# Patient Record
Sex: FEMALE | Race: ASIAN | NOT HISPANIC OR LATINO | Employment: OTHER | URBAN - METROPOLITAN AREA
[De-identification: names, ages, dates, MRNs, and addresses within clinical notes are randomized per-mention and may not be internally consistent; named-entity substitution may affect disease eponyms.]

---

## 2022-06-04 ENCOUNTER — TELEPHONE ENCOUNTER (OUTPATIENT)
Dept: URBAN - METROPOLITAN AREA CLINIC 68 | Facility: CLINIC | Age: 87
End: 2022-06-04

## 2022-06-04 RX ORDER — MIRABEGRON 25 MG/1
MYRBETRIQ( 25MG ORAL 1 DAILY ) INACTIVE -HX ENTRY TABLET, FILM COATED, EXTENDED RELEASE ORAL DAILY
OUTPATIENT
Start: 2015-05-07

## 2022-06-04 RX ORDER — POLYETHYLENE GLYCOL 3350, SODIUM SULFATE, SODIUM CHLORIDE, POTASSIUM CHLORIDE, ASCORBIC ACID, SODIUM ASCORBATE 7.5-2.691G
KIT ORAL AS DIRECTED
Qty: 1 | Refills: 0 | OUTPATIENT
Start: 2015-05-07 | End: 2015-05-08

## 2022-06-04 RX ORDER — POLYETHYLENE GLYOCOL 3350, SODIUM CHLORIDE, SODIUM BICARBONATE AND POTASSIUM CHLORIDE 420; 11.2; 5.72; 1.48 G/4L; G/4L; G/4L; G/4L
POWDER, FOR SOLUTION NASOGASTRIC; ORAL AS DIRECTED
Qty: 1 | Refills: 0 | OUTPATIENT
Start: 2014-10-27 | End: 2014-10-28

## 2022-06-04 RX ORDER — LEVOTHYROXINE SODIUM 0.09 MG/1
LEVOTHYROXINE SODIUM( 88MCG ORAL 1 DAILY ) INACTIVE -HX ENTRY TABLET ORAL DAILY
OUTPATIENT
Start: 2017-02-28

## 2022-06-05 ENCOUNTER — TELEPHONE ENCOUNTER (OUTPATIENT)
Dept: URBAN - METROPOLITAN AREA CLINIC 68 | Facility: CLINIC | Age: 87
End: 2022-06-05

## 2022-06-05 RX ORDER — ZINC OXIDE 13 %
PROBIOTIC DAILY(  ORAL 1 TWO TIMES DAILY ) ACTIVE -HX ENTRY CREAM (GRAM) TOPICAL
Status: ACTIVE | COMMUNITY
Start: 2017-02-28

## 2022-06-05 RX ORDER — SERTRALINE HYDROCHLORIDE 50 MG/1
ZOLOFT( 50MG ORAL 1 TWO TIMES DAILY ) ACTIVE -HX ENTRY TABLET, FILM COATED ORAL
Status: ACTIVE | COMMUNITY
Start: 2017-02-28

## 2022-06-05 RX ORDER — FLAXSEED OIL 1000 MG
FLAX SEED OIL( 1000MG ORAL  DAILY ) ACTIVE -HX ENTRY CAPSULE ORAL DAILY
Status: ACTIVE | COMMUNITY
Start: 2017-02-28

## 2022-06-05 RX ORDER — TITANIUM DIOXIDE, OCTINOXATE, ZINC OXIDE 4.61; 1.6; .78 G/40ML; G/40ML; G/40ML
CRANBERRY( 400MG ORAL 4 TWO TIMES DAILY ) ACTIVE -HX ENTRY CREAM TOPICAL
Status: ACTIVE | COMMUNITY
Start: 2017-02-28

## 2022-06-05 RX ORDER — CHOLECALCIFEROL (VITAMIN D3) 50 MCG
D3 DOTS( 2000UNIT ORAL 1 DAILY ) ACTIVE -HX ENTRY TABLET ORAL DAILY
Status: ACTIVE | COMMUNITY
Start: 2017-02-28

## 2022-06-05 RX ORDER — LEVOTHYROXINE SODIUM 75 UG/1
LEVOTHYROXINE SODIUM( 75MCG ORAL  DAILY ) ACTIVE -HX ENTRY CAPSULE ORAL DAILY
Status: ACTIVE | COMMUNITY
Start: 2017-02-28

## 2022-06-25 ENCOUNTER — TELEPHONE ENCOUNTER (OUTPATIENT)
Age: 87
End: 2022-06-25

## 2022-06-25 RX ORDER — SODIUM SULFATE, POTASSIUM SULFATE, MAGNESIUM SULFATE 17.5; 3.13; 1.6 G/ML; G/ML; G/ML
SOLUTION, CONCENTRATE ORAL AS DIRECTED
Qty: 1 | Refills: 0 | OUTPATIENT
Start: 2017-03-02 | End: 2017-03-03

## 2022-06-25 RX ORDER — POLYETHYLENE GLYCOL 3350, SODIUM SULFATE, SODIUM CHLORIDE, POTASSIUM CHLORIDE, ASCORBIC ACID, SODIUM ASCORBATE 7.5-2.691G
KIT ORAL AS DIRECTED
Qty: 1 | Refills: 0 | OUTPATIENT
Start: 2015-05-07 | End: 2015-05-08

## 2022-06-25 RX ORDER — LEVOTHYROXINE SODIUM 0.09 MG/1
LEVOTHYROXINE SODIUM( 88MCG ORAL 1 DAILY ) INACTIVE -HX ENTRY TABLET ORAL DAILY
OUTPATIENT
Start: 2017-02-28

## 2022-06-25 RX ORDER — CALCIUM CARBONATE 1250 MG/5ML
CALCIUM CARBONATE( 650MG ORAL 1 DAILY ) INACTIVE -HX ENTRY SUSPENSION ORAL DAILY
OUTPATIENT
Start: 2014-10-27

## 2022-06-25 RX ORDER — MIRABEGRON 25 MG/1
MYRBETRIQ( 25MG ORAL 1 DAILY ) INACTIVE -HX ENTRY TABLET, FILM COATED, EXTENDED RELEASE ORAL DAILY
OUTPATIENT
Start: 2015-05-07

## 2022-06-26 ENCOUNTER — TELEPHONE ENCOUNTER (OUTPATIENT)
Age: 87
End: 2022-06-26

## 2022-06-26 RX ORDER — TITANIUM DIOXIDE, OCTINOXATE, ZINC OXIDE 4.61; 1.6; .78 G/40ML; G/40ML; G/40ML
CRANBERRY( 400MG ORAL 4 TWO TIMES DAILY ) ACTIVE -HX ENTRY CREAM TOPICAL
Status: ACTIVE | COMMUNITY
Start: 2017-02-28

## 2022-06-26 RX ORDER — SERTRALINE HCL 50 MG
ZOLOFT( 50MG ORAL 1 TWO TIMES DAILY ) ACTIVE -HX ENTRY TABLET ORAL
Status: ACTIVE | COMMUNITY
Start: 2017-02-28

## 2022-06-26 RX ORDER — LEVOTHYROXINE SODIUM 100 UG/1
LEVOTHYROXINE SODIUM( 75MCG ORAL  DAILY ) ACTIVE -HX ENTRY CAPSULE ORAL DAILY
Status: ACTIVE | COMMUNITY
Start: 2017-02-28

## 2022-06-26 RX ORDER — ZINC OXIDE 13 %
PROBIOTIC DAILY(  ORAL 1 TWO TIMES DAILY ) ACTIVE -HX ENTRY CREAM (GRAM) TOPICAL
Status: ACTIVE | COMMUNITY
Start: 2017-02-28

## 2022-06-26 RX ORDER — FLAXSEED OIL 1000 MG
FLAX SEED OIL( 1000MG ORAL  DAILY ) ACTIVE -HX ENTRY CAPSULE ORAL DAILY
Status: ACTIVE | COMMUNITY
Start: 2017-02-28

## 2022-06-26 RX ORDER — GABAPENTIN 400 MG/1
GABAPENTIN( 400MG ORAL 3 DAILY ) ACTIVE -HX ENTRY CAPSULE ORAL DAILY
Status: ACTIVE | COMMUNITY
Start: 2017-02-28

## 2023-06-07 ENCOUNTER — TELEPHONE ENCOUNTER (OUTPATIENT)
Dept: URBAN - METROPOLITAN AREA CLINIC 68 | Facility: CLINIC | Age: 88
End: 2023-06-07

## 2023-06-07 ENCOUNTER — OFFICE VISIT (OUTPATIENT)
Dept: URBAN - METROPOLITAN AREA CLINIC 68 | Facility: CLINIC | Age: 88
End: 2023-06-07
Payer: MEDICARE

## 2023-06-07 ENCOUNTER — WEB ENCOUNTER (OUTPATIENT)
Dept: URBAN - METROPOLITAN AREA CLINIC 68 | Facility: CLINIC | Age: 88
End: 2023-06-07

## 2023-06-07 ENCOUNTER — DASHBOARD ENCOUNTERS (OUTPATIENT)
Age: 88
End: 2023-06-07

## 2023-06-07 VITALS
WEIGHT: 113 LBS | SYSTOLIC BLOOD PRESSURE: 120 MMHG | DIASTOLIC BLOOD PRESSURE: 70 MMHG | HEIGHT: 64 IN | BODY MASS INDEX: 19.29 KG/M2

## 2023-06-07 DIAGNOSIS — C19 MALIGNANT NEOPLASM OF RECTOSIGMOID JUNCTION: ICD-10-CM

## 2023-06-07 DIAGNOSIS — D64.9 ANEMIA, UNSPECIFIED TYPE: ICD-10-CM

## 2023-06-07 DIAGNOSIS — K59.09 CHRONIC CONSTIPATION: ICD-10-CM

## 2023-06-07 PROCEDURE — 99203 OFFICE O/P NEW LOW 30 MIN: CPT | Performed by: SPECIALIST

## 2023-06-07 RX ORDER — LEVOTHYROXINE SODIUM 75 UG/1
LEVOTHYROXINE SODIUM( 75MCG ORAL  DAILY ) ACTIVE -HX ENTRY CAPSULE ORAL DAILY
Status: ACTIVE | COMMUNITY
Start: 2017-02-28

## 2023-06-07 RX ORDER — FLAXSEED OIL 1000 MG
FLAX SEED OIL( 1000MG ORAL  DAILY ) ACTIVE -HX ENTRY CAPSULE ORAL DAILY
Status: ON HOLD | COMMUNITY
Start: 2017-02-28

## 2023-06-07 RX ORDER — SERTRALINE HCL 50 MG
ZOLOFT( 50MG ORAL 1 TWO TIMES DAILY ) ACTIVE -HX ENTRY TABLET ORAL
Status: ACTIVE | COMMUNITY
Start: 2017-02-28

## 2023-06-07 RX ORDER — GABAPENTIN 400 MG/1
GABAPENTIN( 400MG ORAL 3 DAILY ) ACTIVE -HX ENTRY CAPSULE ORAL DAILY
Status: ACTIVE | COMMUNITY
Start: 2017-02-28

## 2023-06-07 RX ORDER — LEVOTHYROXINE SODIUM 100 UG/1
LEVOTHYROXINE SODIUM( 75MCG ORAL  DAILY ) ACTIVE -HX ENTRY CAPSULE ORAL DAILY
Status: ACTIVE | COMMUNITY
Start: 2017-02-28

## 2023-06-07 RX ORDER — ZINC OXIDE 13 %
PROBIOTIC DAILY(  ORAL 1 TWO TIMES DAILY ) ACTIVE -HX ENTRY CREAM (GRAM) TOPICAL
Status: ON HOLD | COMMUNITY
Start: 2017-02-28

## 2023-06-07 RX ORDER — LEVOTHYROXINE, LIOTHYRONINE 19; 4.5 UG/1; UG/1
TAKE ONE TABLET BY MOUTH ONE TIME DAILY ON AN EMPTY STOMACH TABLET ORAL
Qty: 90 UNSPECIFIED | Refills: 0 | Status: ACTIVE | COMMUNITY

## 2023-06-07 RX ORDER — DESVENLAFAXINE 100 MG/1
TAKE ONE TABLET BY MOUTH ONE TIME DAILY TABLET, EXTENDED RELEASE ORAL
Qty: 90 UNSPECIFIED | Refills: 0 | Status: ACTIVE | COMMUNITY

## 2023-06-07 RX ORDER — TITANIUM DIOXIDE, OCTINOXATE, ZINC OXIDE 4.61; 1.6; .78 G/40ML; G/40ML; G/40ML
CRANBERRY( 400MG ORAL 4 TWO TIMES DAILY ) ACTIVE -HX ENTRY CREAM TOPICAL
Status: ACTIVE | COMMUNITY
Start: 2017-02-28

## 2023-06-07 RX ORDER — METHENAMINE HIPPURATE 1 G/1
TAKE ONE TABLET BY MOUTH TWICE A DAY TABLET ORAL
Qty: 180 UNSPECIFIED | Refills: 0 | Status: ACTIVE | COMMUNITY

## 2023-06-07 RX ORDER — MIDODRINE HYDROCHLORIDE 2.5 MG/1
TAKE ONE TABLET BY MOUTH THREE TIMES A DAY ( WITH BREAKFAST, LUNCH AND EVENING MEAL ) TABLET ORAL
Qty: 90 UNSPECIFIED | Refills: 0 | Status: ON HOLD | COMMUNITY

## 2023-06-07 RX ORDER — PRAVASTATIN SODIUM 20 MG/1
TABLET ORAL
Qty: 90 TABLET | Status: ACTIVE | COMMUNITY

## 2023-06-07 RX ORDER — TRIAMCINOLONE ACETONIDE 1 MG/G
APPLY TO THE AFFECTED AREA(S) TOPICALLY TWICE DAILY FOR 5 DAYS CREAM TOPICAL
Qty: 15 UNSPECIFIED | Refills: 0 | Status: ON HOLD | COMMUNITY

## 2024-10-29 ENCOUNTER — TELEPHONE ENCOUNTER (OUTPATIENT)
Dept: URBAN - METROPOLITAN AREA CLINIC 6 | Facility: CLINIC | Age: 89
End: 2024-10-29

## 2024-11-05 ENCOUNTER — OFFICE VISIT (OUTPATIENT)
Dept: URBAN - METROPOLITAN AREA CLINIC 68 | Facility: CLINIC | Age: 89
End: 2024-11-05

## 2024-11-05 ENCOUNTER — LAB OUTSIDE AN ENCOUNTER (OUTPATIENT)
Dept: URBAN - METROPOLITAN AREA CLINIC 68 | Facility: CLINIC | Age: 89
End: 2024-11-05

## 2024-11-05 VITALS
DIASTOLIC BLOOD PRESSURE: 78 MMHG | SYSTOLIC BLOOD PRESSURE: 126 MMHG | TEMPERATURE: 98.6 F | HEART RATE: 67 BPM | WEIGHT: 110 LBS | HEIGHT: 64 IN | BODY MASS INDEX: 18.78 KG/M2 | OXYGEN SATURATION: 98 %

## 2024-11-05 RX ORDER — MIDODRINE HYDROCHLORIDE 2.5 MG/1
TAKE ONE TABLET BY MOUTH THREE TIMES A DAY ( WITH BREAKFAST, LUNCH AND EVENING MEAL ) TABLET ORAL
Qty: 90 UNSPECIFIED | Refills: 0 | Status: ACTIVE | COMMUNITY

## 2024-11-05 RX ORDER — LEVOTHYROXINE SODIUM 100 UG/1
LEVOTHYROXINE SODIUM( 75MCG ORAL  DAILY ) ACTIVE -HX ENTRY CAPSULE ORAL DAILY
Status: ACTIVE | COMMUNITY
Start: 2017-02-28

## 2024-11-05 RX ORDER — PRAVASTATIN SODIUM 20 MG/1
TABLET ORAL
Qty: 90 TABLET | Status: ACTIVE | COMMUNITY

## 2024-11-05 RX ORDER — TITANIUM DIOXIDE, OCTINOXATE, ZINC OXIDE 4.61; 1.6; .78 G/40ML; G/40ML; G/40ML
CRANBERRY( 400MG ORAL 4 TWO TIMES DAILY ) ACTIVE -HX ENTRY CREAM TOPICAL
Status: ACTIVE | COMMUNITY
Start: 2017-02-28

## 2024-11-05 RX ORDER — DESVENLAFAXINE 100 MG/1
TAKE ONE TABLET BY MOUTH ONE TIME DAILY TABLET, EXTENDED RELEASE ORAL
Qty: 90 UNSPECIFIED | Refills: 0 | Status: ACTIVE | COMMUNITY

## 2024-11-05 RX ORDER — SERTRALINE HCL 50 MG
ZOLOFT( 50MG ORAL 1 TWO TIMES DAILY ) ACTIVE -HX ENTRY TABLET ORAL
Status: ACTIVE | COMMUNITY
Start: 2017-02-28

## 2024-11-05 RX ORDER — LEVOTHYROXINE SODIUM 75 UG/1
LEVOTHYROXINE SODIUM( 75MCG ORAL  DAILY ) ACTIVE -HX ENTRY CAPSULE ORAL DAILY
Status: ACTIVE | COMMUNITY
Start: 2017-02-28

## 2024-11-05 RX ORDER — GABAPENTIN 400 MG/1
GABAPENTIN( 400MG ORAL 3 DAILY ) ACTIVE -HX ENTRY CAPSULE ORAL DAILY
Status: ACTIVE | COMMUNITY
Start: 2017-02-28

## 2024-11-05 RX ORDER — METHENAMINE HIPPURATE 1 G/1
TAKE ONE TABLET BY MOUTH TWICE A DAY TABLET ORAL
Qty: 180 UNSPECIFIED | Refills: 0 | Status: ACTIVE | COMMUNITY

## 2024-11-05 RX ORDER — FLAXSEED OIL 1000 MG
FLAX SEED OIL( 1000MG ORAL  DAILY ) ACTIVE -HX ENTRY CAPSULE ORAL DAILY
Status: ACTIVE | COMMUNITY
Start: 2017-02-28

## 2024-11-05 RX ORDER — LEVOTHYROXINE, LIOTHYRONINE 19; 4.5 UG/1; UG/1
TAKE ONE TABLET BY MOUTH ONE TIME DAILY ON AN EMPTY STOMACH TABLET ORAL
Qty: 90 UNSPECIFIED | Refills: 0 | Status: ACTIVE | COMMUNITY

## 2024-11-05 RX ORDER — TRIAMCINOLONE ACETONIDE 1 MG/G
APPLY TO THE AFFECTED AREA(S) TOPICALLY TWICE DAILY FOR 5 DAYS CREAM TOPICAL
Qty: 15 UNSPECIFIED | Refills: 0 | Status: ACTIVE | COMMUNITY

## 2024-11-05 RX ORDER — ZINC OXIDE 13 %
PROBIOTIC DAILY(  ORAL 1 TWO TIMES DAILY ) ACTIVE -HX ENTRY CREAM (GRAM) TOPICAL
Status: ACTIVE | COMMUNITY
Start: 2017-02-28

## 2024-11-05 NOTE — HPI-TODAY'S VISIT:
hasd stools once every 2-3 days , tried miralax too strong New diagnosis Parkinsons no bleeding nuisance swelling and slight leakage Last colon 7 years ago History colon surgery 2012

## 2024-11-07 ENCOUNTER — OFFICE VISIT (OUTPATIENT)
Dept: URBAN - METROPOLITAN AREA SURGERY CENTER 12 | Facility: SURGERY CENTER | Age: 89
End: 2024-11-07
Payer: MEDICARE

## 2024-11-07 DIAGNOSIS — R19.4 CHANGE IN BOWEL HABIT: ICD-10-CM

## 2024-11-07 DIAGNOSIS — K64.1 SECOND DEGREE HEMORRHOIDS: ICD-10-CM

## 2024-11-07 PROCEDURE — 45330 DIAGNOSTIC SIGMOIDOSCOPY: CPT | Performed by: SPECIALIST

## 2024-11-07 PROCEDURE — 45330 DIAGNOSTIC SIGMOIDOSCOPY: CPT | Performed by: CLINIC/CENTER

## 2024-11-07 RX ORDER — ZINC OXIDE 13 %
PROBIOTIC DAILY(  ORAL 1 TWO TIMES DAILY ) ACTIVE -HX ENTRY CREAM (GRAM) TOPICAL
Status: ACTIVE | COMMUNITY
Start: 2017-02-28

## 2024-11-07 RX ORDER — DESVENLAFAXINE 100 MG/1
TAKE ONE TABLET BY MOUTH ONE TIME DAILY TABLET, EXTENDED RELEASE ORAL
Qty: 90 UNSPECIFIED | Refills: 0 | Status: ACTIVE | COMMUNITY

## 2024-11-07 RX ORDER — FLAXSEED OIL 1000 MG
FLAX SEED OIL( 1000MG ORAL  DAILY ) ACTIVE -HX ENTRY CAPSULE ORAL DAILY
Status: ACTIVE | COMMUNITY
Start: 2017-02-28

## 2024-11-07 RX ORDER — GABAPENTIN 400 MG/1
GABAPENTIN( 400MG ORAL 3 DAILY ) ACTIVE -HX ENTRY CAPSULE ORAL DAILY
Status: ACTIVE | COMMUNITY
Start: 2017-02-28

## 2024-11-07 RX ORDER — SERTRALINE HCL 50 MG
ZOLOFT( 50MG ORAL 1 TWO TIMES DAILY ) ACTIVE -HX ENTRY TABLET ORAL
Status: ACTIVE | COMMUNITY
Start: 2017-02-28

## 2024-11-07 RX ORDER — METHENAMINE HIPPURATE 1 G/1
TAKE ONE TABLET BY MOUTH TWICE A DAY TABLET ORAL
Qty: 180 UNSPECIFIED | Refills: 0 | Status: ACTIVE | COMMUNITY

## 2024-11-07 RX ORDER — LEVOTHYROXINE, LIOTHYRONINE 19; 4.5 UG/1; UG/1
TAKE ONE TABLET BY MOUTH ONE TIME DAILY ON AN EMPTY STOMACH TABLET ORAL
Qty: 90 UNSPECIFIED | Refills: 0 | Status: ACTIVE | COMMUNITY

## 2024-11-07 RX ORDER — TRIAMCINOLONE ACETONIDE 1 MG/G
APPLY TO THE AFFECTED AREA(S) TOPICALLY TWICE DAILY FOR 5 DAYS CREAM TOPICAL
Qty: 15 UNSPECIFIED | Refills: 0 | Status: ACTIVE | COMMUNITY

## 2024-11-07 RX ORDER — LEVOTHYROXINE SODIUM 75 UG/1
LEVOTHYROXINE SODIUM( 75MCG ORAL  DAILY ) ACTIVE -HX ENTRY CAPSULE ORAL DAILY
Status: ACTIVE | COMMUNITY
Start: 2017-02-28

## 2024-11-07 RX ORDER — LEVOTHYROXINE SODIUM 100 UG/1
LEVOTHYROXINE SODIUM( 75MCG ORAL  DAILY ) ACTIVE -HX ENTRY CAPSULE ORAL DAILY
Status: ACTIVE | COMMUNITY
Start: 2017-02-28

## 2024-11-07 RX ORDER — MIDODRINE HYDROCHLORIDE 2.5 MG/1
TAKE ONE TABLET BY MOUTH THREE TIMES A DAY ( WITH BREAKFAST, LUNCH AND EVENING MEAL ) TABLET ORAL
Qty: 90 UNSPECIFIED | Refills: 0 | Status: ACTIVE | COMMUNITY

## 2024-11-07 RX ORDER — PRAVASTATIN SODIUM 20 MG/1
TABLET ORAL
Qty: 90 TABLET | Status: ACTIVE | COMMUNITY

## 2024-11-07 RX ORDER — TITANIUM DIOXIDE, OCTINOXATE, ZINC OXIDE 4.61; 1.6; .78 G/40ML; G/40ML; G/40ML
CRANBERRY( 400MG ORAL 4 TWO TIMES DAILY ) ACTIVE -HX ENTRY CREAM TOPICAL
Status: ACTIVE | COMMUNITY
Start: 2017-02-28

## 2024-11-18 ENCOUNTER — OFFICE VISIT (OUTPATIENT)
Dept: URBAN - METROPOLITAN AREA CLINIC 68 | Facility: CLINIC | Age: 89
End: 2024-11-18
Payer: MEDICARE

## 2024-11-18 ENCOUNTER — LAB OUTSIDE AN ENCOUNTER (OUTPATIENT)
Dept: URBAN - METROPOLITAN AREA CLINIC 68 | Facility: CLINIC | Age: 89
End: 2024-11-18

## 2024-11-18 VITALS
WEIGHT: 110 LBS | HEIGHT: 64 IN | DIASTOLIC BLOOD PRESSURE: 70 MMHG | SYSTOLIC BLOOD PRESSURE: 118 MMHG | BODY MASS INDEX: 18.78 KG/M2

## 2024-11-18 DIAGNOSIS — K64.1 GRADE II HEMORRHOIDS: ICD-10-CM

## 2024-11-18 PROCEDURE — 46221 LIGATION OF HEMORRHOID(S): CPT | Performed by: INTERNAL MEDICINE

## 2024-11-18 PROCEDURE — 99212 OFFICE O/P EST SF 10 MIN: CPT | Performed by: INTERNAL MEDICINE

## 2024-11-18 RX ORDER — SERTRALINE HCL 50 MG
ZOLOFT( 50MG ORAL 1 TWO TIMES DAILY ) ACTIVE -HX ENTRY TABLET ORAL
Status: ACTIVE | COMMUNITY
Start: 2017-02-28

## 2024-11-18 RX ORDER — LEVOTHYROXINE SODIUM 100 UG/1
LEVOTHYROXINE SODIUM( 75MCG ORAL  DAILY ) ACTIVE -HX ENTRY CAPSULE ORAL DAILY
Status: ACTIVE | COMMUNITY
Start: 2017-02-28

## 2024-11-18 RX ORDER — TITANIUM DIOXIDE, OCTINOXATE, ZINC OXIDE 4.61; 1.6; .78 G/40ML; G/40ML; G/40ML
CRANBERRY( 400MG ORAL 4 TWO TIMES DAILY ) ACTIVE -HX ENTRY CREAM TOPICAL
Status: ACTIVE | COMMUNITY
Start: 2017-02-28

## 2024-11-18 RX ORDER — DESVENLAFAXINE 100 MG/1
TAKE ONE TABLET BY MOUTH ONE TIME DAILY TABLET, EXTENDED RELEASE ORAL
Qty: 90 UNSPECIFIED | Refills: 0 | Status: ACTIVE | COMMUNITY

## 2024-11-18 RX ORDER — GABAPENTIN 400 MG/1
GABAPENTIN( 400MG ORAL 3 DAILY ) ACTIVE -HX ENTRY CAPSULE ORAL DAILY
Status: ACTIVE | COMMUNITY
Start: 2017-02-28

## 2024-11-18 RX ORDER — FLAXSEED OIL 1000 MG
FLAX SEED OIL( 1000MG ORAL  DAILY ) ACTIVE -HX ENTRY CAPSULE ORAL DAILY
Status: ACTIVE | COMMUNITY
Start: 2017-02-28

## 2024-11-18 RX ORDER — LEVOTHYROXINE SODIUM 75 UG/1
LEVOTHYROXINE SODIUM( 75MCG ORAL  DAILY ) ACTIVE -HX ENTRY CAPSULE ORAL DAILY
Status: ACTIVE | COMMUNITY
Start: 2017-02-28

## 2024-11-18 RX ORDER — PRAVASTATIN SODIUM 20 MG/1
TABLET ORAL
Qty: 90 TABLET | Status: ACTIVE | COMMUNITY

## 2024-11-18 RX ORDER — LEVOTHYROXINE, LIOTHYRONINE 19; 4.5 UG/1; UG/1
TAKE ONE TABLET BY MOUTH ONE TIME DAILY ON AN EMPTY STOMACH TABLET ORAL
Qty: 90 UNSPECIFIED | Refills: 0 | Status: ACTIVE | COMMUNITY

## 2024-11-18 RX ORDER — TRIAMCINOLONE ACETONIDE 1 MG/G
APPLY TO THE AFFECTED AREA(S) TOPICALLY TWICE DAILY FOR 5 DAYS CREAM TOPICAL
Qty: 15 UNSPECIFIED | Refills: 0 | Status: ACTIVE | COMMUNITY

## 2024-11-18 RX ORDER — ZINC OXIDE 13 %
PROBIOTIC DAILY(  ORAL 1 TWO TIMES DAILY ) ACTIVE -HX ENTRY CREAM (GRAM) TOPICAL
Status: ACTIVE | COMMUNITY
Start: 2017-02-28

## 2024-11-18 RX ORDER — METHENAMINE HIPPURATE 1 G/1
TAKE ONE TABLET BY MOUTH TWICE A DAY TABLET ORAL
Qty: 180 UNSPECIFIED | Refills: 0 | Status: ACTIVE | COMMUNITY

## 2024-11-18 RX ORDER — MIDODRINE HYDROCHLORIDE 2.5 MG/1
TAKE ONE TABLET BY MOUTH THREE TIMES A DAY ( WITH BREAKFAST, LUNCH AND EVENING MEAL ) TABLET ORAL
Qty: 90 UNSPECIFIED | Refills: 0 | Status: ACTIVE | COMMUNITY

## 2024-11-18 NOTE — HPI-TODAY'S VISIT:
Case of an 89-year-old male patient that comes in today for follow-up.  Patient has been experiencing hemorrhoidal symptoms.  She describes perianal discomfort.  She also describes episodes of fecal soiling intermittently sporadic as well as a sensation of feeling wet in her rectal area intermittently.  For evaluation she underwent flexible sigmoidoscopy results detailed below only remarkable finding was internal hemorrhoids.  She has undergone maximal medical therapy without improvement.  She comes in today for follow-up seeking hemorrhoidal banding.

## 2024-12-06 ENCOUNTER — LAB OUTSIDE AN ENCOUNTER (OUTPATIENT)
Dept: URBAN - METROPOLITAN AREA CLINIC 68 | Facility: CLINIC | Age: 89
End: 2024-12-06

## 2024-12-06 ENCOUNTER — OFFICE VISIT (OUTPATIENT)
Dept: URBAN - METROPOLITAN AREA CLINIC 68 | Facility: CLINIC | Age: 89
End: 2024-12-06
Payer: MEDICARE

## 2024-12-06 VITALS
SYSTOLIC BLOOD PRESSURE: 116 MMHG | DIASTOLIC BLOOD PRESSURE: 72 MMHG | HEIGHT: 64 IN | WEIGHT: 110 LBS | BODY MASS INDEX: 18.78 KG/M2

## 2024-12-06 DIAGNOSIS — K64.1 GRADE II HEMORRHOIDS: ICD-10-CM

## 2024-12-06 PROCEDURE — 99212 OFFICE O/P EST SF 10 MIN: CPT | Performed by: INTERNAL MEDICINE

## 2024-12-06 PROCEDURE — 46221 LIGATION OF HEMORRHOID(S): CPT | Performed by: INTERNAL MEDICINE

## 2024-12-06 RX ORDER — GABAPENTIN 400 MG/1
GABAPENTIN( 400MG ORAL 3 DAILY ) ACTIVE -HX ENTRY CAPSULE ORAL DAILY
Status: ACTIVE | COMMUNITY
Start: 2017-02-28

## 2024-12-06 RX ORDER — TITANIUM DIOXIDE, OCTINOXATE, ZINC OXIDE 4.61; 1.6; .78 G/40ML; G/40ML; G/40ML
CRANBERRY( 400MG ORAL 4 TWO TIMES DAILY ) ACTIVE -HX ENTRY CREAM TOPICAL
Status: ACTIVE | COMMUNITY
Start: 2017-02-28

## 2024-12-06 RX ORDER — FLAXSEED OIL 1000 MG
FLAX SEED OIL( 1000MG ORAL  DAILY ) ACTIVE -HX ENTRY CAPSULE ORAL DAILY
Status: ACTIVE | COMMUNITY
Start: 2017-02-28

## 2024-12-06 RX ORDER — METHENAMINE HIPPURATE 1 G/1
TAKE ONE TABLET BY MOUTH TWICE A DAY TABLET ORAL
Qty: 180 UNSPECIFIED | Refills: 0 | Status: ACTIVE | COMMUNITY

## 2024-12-06 RX ORDER — MIDODRINE HYDROCHLORIDE 2.5 MG/1
TAKE ONE TABLET BY MOUTH THREE TIMES A DAY ( WITH BREAKFAST, LUNCH AND EVENING MEAL ) TABLET ORAL
Qty: 90 UNSPECIFIED | Refills: 0 | Status: ACTIVE | COMMUNITY

## 2024-12-06 RX ORDER — LEVOTHYROXINE SODIUM 75 UG/1
LEVOTHYROXINE SODIUM( 75MCG ORAL  DAILY ) ACTIVE -HX ENTRY CAPSULE ORAL DAILY
Status: ACTIVE | COMMUNITY
Start: 2017-02-28

## 2024-12-06 RX ORDER — TRIAMCINOLONE ACETONIDE 1 MG/G
APPLY TO THE AFFECTED AREA(S) TOPICALLY TWICE DAILY FOR 5 DAYS CREAM TOPICAL
Qty: 15 UNSPECIFIED | Refills: 0 | Status: ACTIVE | COMMUNITY

## 2024-12-06 RX ORDER — DESVENLAFAXINE 100 MG/1
TAKE ONE TABLET BY MOUTH ONE TIME DAILY TABLET, EXTENDED RELEASE ORAL
Qty: 90 UNSPECIFIED | Refills: 0 | Status: ACTIVE | COMMUNITY

## 2024-12-06 RX ORDER — SERTRALINE HCL 50 MG
ZOLOFT( 50MG ORAL 1 TWO TIMES DAILY ) ACTIVE -HX ENTRY TABLET ORAL
Status: ACTIVE | COMMUNITY
Start: 2017-02-28

## 2024-12-06 RX ORDER — ZINC OXIDE 13 %
PROBIOTIC DAILY(  ORAL 1 TWO TIMES DAILY ) ACTIVE -HX ENTRY CREAM (GRAM) TOPICAL
Status: ACTIVE | COMMUNITY
Start: 2017-02-28

## 2024-12-06 RX ORDER — PRAVASTATIN SODIUM 20 MG/1
TABLET ORAL
Qty: 90 TABLET | Status: ACTIVE | COMMUNITY

## 2024-12-06 RX ORDER — LEVOTHYROXINE SODIUM 100 UG/1
LEVOTHYROXINE SODIUM( 75MCG ORAL  DAILY ) ACTIVE -HX ENTRY CAPSULE ORAL DAILY
Status: ACTIVE | COMMUNITY
Start: 2017-02-28

## 2024-12-06 RX ORDER — LEVOTHYROXINE, LIOTHYRONINE 19; 4.5 UG/1; UG/1
TAKE ONE TABLET BY MOUTH ONE TIME DAILY ON AN EMPTY STOMACH TABLET ORAL
Qty: 90 UNSPECIFIED | Refills: 0 | Status: ACTIVE | COMMUNITY

## 2024-12-06 NOTE — HPI-TODAY'S VISIT:
Case of an 89-year-old male patient that comes in today for follow-up of hermorrhoidal disease.  Patient has been experiencing hemorrhoidal symptoms.  She describes perianal discomfort.  She also describes episodes of fecal soiling intermittently sporadic as well as a sensation of feeling wet in her rectal area intermittently.  For evaluation she underwent flexible sigmoidoscopy results detailed below only remarkable finding was internal hemorrhoids.  She has undergone maximal medical therapy without improvement.  She underwent hemorroidal banding of the LL hemorrhoidal column. She refers today she continues with above symptoms unchange.